# Patient Record
(demographics unavailable — no encounter records)

---

## 2024-10-11 NOTE — PHYSICAL EXAM
[Chaperone Present] : A chaperone was present in the examining room during all aspects of the physical examination [42304] : A chaperone was present during the pelvic exam. [Fully active, able to carry on all pre-disease performance without restriction] : Status 0 - Fully active, able to carry on all pre-disease performance without restriction

## 2024-10-11 NOTE — HISTORY OF PRESENT ILLNESS
[FreeTextEntry1] : **OZEMPIC** 55yo P2  LMP unknown with Mirena IUD in place since 2016. 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 She is s/p B/L mastectomy with Dr Lovell on 9/24/24 and is due to meet with Dr. Gutierrez ([facility]) for adjuvant/ maintenance therapy counseling. She desires RR surgery with hyst/bso. she denies n/v/fever/bleeding/bloating. Reports normal urination and BMs.   PMHx: HLD, DM, sleep apnea, breast ca PSHx: c/s x2, b/l mastectomy 9/2024 OBGYNHx: C/s x2, no hx of fibroids/cysts/abn paps/stis FamHx: father prostate ca. + stomach cancer. no gyn ca, no breast ca, no colon ca SocHx: denies All: none   mammogram: up to date, dx'd breast ca 2024 colonoscopy: up to date   Labs: 9/6/24: BRCA2+   4/27/24: NILM/ HPV neg

## 2024-10-11 NOTE — DISCUSSION/SUMMARY
[Reviewed Clinical Lab Test(s)] : Results of clinical tests were reviewed. [Discuss Alternatives/Risks/Benefits w/Patient] : All alternatives, risks, and benefits were discussed with the patient/family and all questions were answered.  Patient expressed good understanding and appreciates the importance of follow up as recommended. [Visit Time ___ Minutes] : [unfilled] minutes [Face to Face Time___ Minutes] : with [unfilled] minutes in face to face consultation. [FreeTextEntry1] : 57yo P2 w/ recent dx of ER/CA+ breast ca and BRCA2+ mutation. Desires RR hyst/bso. -more than 50% of visit spent face to face with patient reviewing records and interpreting imaging/path/lab results, counseling and coordinating care  -I reviewed in detail her BRCA2+ status. I explained that in patients with known deleterious BRCA2 mutation there are several benefits to prophylactic BSO. One is the decreased risk of developing ovarian cancer, the one other benefit is a decrease in the recurrence of HR+ breast cancer. The lifetime risk of developing ovarian cancer in patients with deleterious BRCA2 mutation is 20% -I explained that the current recommendation from our governing bodies ( SGO and ACOG ) is that patients undergo RRBSO at the age of 35 or when childbearing is complete. Current data suggest that the origin of most high-grade serous tumors associated with BRCA1 mutation arise from the fallopian tube. A study assessing the effect of risk reducing salpingectomy (without removal of the ovaries) is ongoing. Without these results we cannot know if RRS will be as effective as RRBSO in decreasing risk of ovarian cancer. However, based on current SGO recommendations, it can be considered as an option in younger women who do not yet want to enter surgical menopause. -I explained to patient that although guidelines state age 35 or when childbearing complete, there is data that supports delaying RR surgery in BRCA2 carriers to 45-49yo since highest risk for ovarian cancer falls in 50-60th decades as opposed to BRCA1 carriers whose highest risk for ovarian cancer is in 40th decade. -I explained role of Hysterectomy and that it is not recommended as part of standard RR surgery guideline although can be an option for BRCA1+ patients given the 5-8% risk of developing uterine cancer in this population, or an elective option if patient desires removal. It can also be offered in cases of tamoxifen use as there is an increased risk of uterine pre-cancer and cancer with tamoxifen use. all questions answered and patient expressed understanding -Pt likely already in menopause but I did review symptoms of menopause that she may experience transiently post-op are hot flushes/mood changes/decreased libido/vaginal dryness/insomnia. I reviewed decreased bone health/cardiac health/mental health as part of natural menopause transition which she has gone through -Finally I reviewed screening options, although screening modalities have been proven ineffective in detecting ovarian cancer at earlier stages. In the absence of better options pelvic exams, transvaginal ultrasounds and CA-125 every 6 months can be considered for patients who decline surgical intervention. -patient strongly desires RR hyst/bso. I reviewed surgical plan and MIS approach with robotics. I discussed role of frozen section at time of surgery and if cancer found then cancer staging would be performed which includes pelvic and paraaortic LND, peritoneal biopsies, omx. all questions answered and patient expressed understanding -baseline pelvic sono and ca125, call with results -OR booking: robo tlh/bso/possible staging/ possible laparotomy -ERAS, pre-op clearance at [facility], labs, covid testing as pre protocol -pt advised to stop Ozempic 1wk prior to surgery -the above diagnosis, nature of treatment, procedure, options, benefits, and risks were reviewed. I explained in detail the possible complications including but not limited to bleeding, transfusion, transfusion complications, infection, injury to internal organs such as injury to gastrointestinal or urinary tract, possible fistula formation, prolonged catherization, intestinal or urinary tract obstruction, vascular injury, wound complications, venous thrombosis, pulmonary embolus, sepsis, pain, chronic disability, and fatal complications, possible re-operation to correct complications, and possible abandoning or modifying the procedure due to inoperative findings was discussed. All questions were answered. Patient verbally indicated that she understood the nature of treatment, indications, options, benefits, and risks. Patient elected and consented to the procedure. Pt was informed that there was no guarantee for outcome or cure, and that additional therapy may be indicated depending on the operative findings.  -f/u post-op.  -pain/fever/bleeding precautions given

## 2024-10-11 NOTE — DISCUSSION/SUMMARY
[Reviewed Clinical Lab Test(s)] : Results of clinical tests were reviewed. [Discuss Alternatives/Risks/Benefits w/Patient] : All alternatives, risks, and benefits were discussed with the patient/family and all questions were answered.  Patient expressed good understanding and appreciates the importance of follow up as recommended. [Visit Time ___ Minutes] : [unfilled] minutes [Face to Face Time___ Minutes] : with [unfilled] minutes in face to face consultation. [FreeTextEntry1] : 55yo P2 w/ recent dx of ER/[place]+ breast ca and BRCA2+ mutation. Desires RR hyst/bso. -more than 50% of visit spent face to face with patient reviewing records and interpreting imaging/path/lab results, counseling and coordinating care  -I reviewed in detail her BRCA2+ status. I explained that in patients with known deleterious BRCA2 mutation there are several benefits to prophylactic BSO. One is the decreased risk of developing ovarian cancer, the one other benefit is a decrease in the recurrence of HR+ breast cancer. The lifetime risk of developing ovarian cancer in patients with deleterious BRCA2 mutation is 20% -I explained that the current recommendation from our governing bodies ( SGO and ACOG ) is that patients undergo RRBSO at the age of 35 or when childbearing is complete. Current data suggest that the origin of most high-grade serous tumors associated with BRCA1 mutation arise from the fallopian tube. A study assessing the effect of risk reducing salpingectomy (without removal of the ovaries) is ongoing. Without these results we cannot know if RRS will be as effective as RRBSO in decreasing risk of ovarian cancer. However, based on current SGO recommendations, it can be considered as an option in younger women who do not yet want to enter surgical menopause. -I explained to patient that although guidelines state age 35 or when childbearing complete, there is data that supports delaying RR surgery in BRCA2 carriers to 45-49yo since highest risk for ovarian cancer falls in 50-60th decades as opposed to BRCA1 carriers whose highest risk for ovarian cancer is in 40th decade. -I explained role of Hysterectomy and that it is not recommended as part of standard RR surgery guideline although can be an option for BRCA1+ patients given the 5-8% risk of developing uterine cancer in this population, or an elective option if patient desires removal. It can also be offered in cases of tamoxifen use as there is an increased risk of uterine pre-cancer and cancer with tamoxifen use. all questions answered and patient expressed understanding -Pt likely already in menopause but I did review symptoms of menopause that she may experience transiently post-op are hot flushes/mood changes/decreased libido/vaginal dryness/insomnia. I reviewed decreased bone health/cardiac health/mental health as part of natural menopause transition which she has gone through -Finally I reviewed screening options, although screening modalities have been proven ineffective in detecting ovarian cancer at earlier stages. In the absence of better options pelvic exams, transvaginal ultrasounds and CA-125 every 6 months can be considered for patients who decline surgical intervention. -patient strongly desires RR hyst/bso. I reviewed surgical plan and MIS approach with robotics. I discussed role of frozen section at time of surgery and if cancer found then cancer staging would be performed which includes pelvic and paraaortic LND, peritoneal biopsies, omx. all questions answered and patient expressed understanding -baseline pelvic sono and ca125, call with results -OR booking: robo tlh/bso/possible staging/ possible laparotomy -ERAS, pre-op clearance at [facility], labs, covid testing as pre protocol -pt advised to stop Ozempic 1wk prior to surgery -the above diagnosis, nature of treatment, procedure, options, benefits, and risks were reviewed. I explained in detail the possible complications including but not limited to bleeding, transfusion, transfusion complications, infection, injury to internal organs such as injury to gastrointestinal or urinary tract, possible fistula formation, prolonged catherization, intestinal or urinary tract obstruction, vascular injury, wound complications, venous thrombosis, pulmonary embolus, sepsis, pain, chronic disability, and fatal complications, possible re-operation to correct complications, and possible abandoning or modifying the procedure due to inoperative findings was discussed. All questions were answered. Patient verbally indicated that she understood the nature of treatment, indications, options, benefits, and risks. Patient elected and consented to the procedure. Pt was informed that there was no guarantee for outcome or cure, and that additional therapy may be indicated depending on the operative findings.  -f/u post-op.  -pain/fever/bleeding precautions given

## 2024-10-11 NOTE — PHYSICAL EXAM
[Chaperone Present] : A chaperone was present in the examining room during all aspects of the physical examination [44227] : A chaperone was present during the pelvic exam. [Fully active, able to carry on all pre-disease performance without restriction] : Status 0 - Fully active, able to carry on all pre-disease performance without restriction

## 2024-10-11 NOTE — HISTORY OF PRESENT ILLNESS
[FreeTextEntry1] : **OZEMPIC** 55yo P2  LMP unknown with Mirena IUD in place since 2016. Pt referred for new diagnosis of ER/NE+ DCIS and finding of BRCA2 mutation. She is s/p B/L mastectomy with Dr Lovell on 9/24/24 and is due to meet with Dr. Gutierrez ([facility]) for adjuvant/ maintenance therapy counseling. She desires RR surgery with hyst/bso. she denies n/v/fever/bleeding/bloating. Reports normal urination and BMs.   PMHx: HLD, DM, sleep apnea, breast ca PSHx: c/s x2, b/l mastectomy 9/2024 OBGYNHx: C/s x2, no hx of fibroids/cysts/abn paps/stis FamHx: father prostate ca. + stomach cancer. no gyn ca, no breast ca, no colon ca SocHx: denies All: none   mammogram: up to date, dx'd breast ca 2024 colonoscopy: up to date   Labs: 9/6/24: BRCA2+   4/27/24: NILM/ HPV neg

## 2024-10-11 NOTE — DISCUSSION/SUMMARY
[Reviewed Clinical Lab Test(s)] : Results of clinical tests were reviewed. [Discuss Alternatives/Risks/Benefits w/Patient] : All alternatives, risks, and benefits were discussed with the patient/family and all questions were answered.  Patient expressed good understanding and appreciates the importance of follow up as recommended. [Visit Time ___ Minutes] : [unfilled] minutes [Face to Face Time___ Minutes] : with [unfilled] minutes in face to face consultation. [FreeTextEntry1] : 55yo P2 w/ recent dx of ER/[place]+ breast ca and BRCA2+ mutation. Desires RR hyst/bso. -more than 50% of visit spent face to face with patient reviewing records and interpreting imaging/path/lab results, counseling and coordinating care  -I reviewed in detail her BRCA2+ status. I explained that in patients with known deleterious BRCA2 mutation there are several benefits to prophylactic BSO. One is the decreased risk of developing ovarian cancer, the one other benefit is a decrease in the recurrence of HR+ breast cancer. The lifetime risk of developing ovarian cancer in patients with deleterious BRCA2 mutation is 20% -I explained that the current recommendation from our governing bodies ( SGO and ACOG ) is that patients undergo RRBSO at the age of 35 or when childbearing is complete. Current data suggest that the origin of most high-grade serous tumors associated with BRCA1 mutation arise from the fallopian tube. A study assessing the effect of risk reducing salpingectomy (without removal of the ovaries) is ongoing. Without these results we cannot know if RRS will be as effective as RRBSO in decreasing risk of ovarian cancer. However, based on current SGO recommendations, it can be considered as an option in younger women who do not yet want to enter surgical menopause. -I explained to patient that although guidelines state age 35 or when childbearing complete, there is data that supports delaying RR surgery in BRCA2 carriers to 45-51yo since highest risk for ovarian cancer falls in 50-60th decades as opposed to BRCA1 carriers whose highest risk for ovarian cancer is in 40th decade. -I explained role of Hysterectomy and that it is not recommended as part of standard RR surgery guideline although can be an option for BRCA1+ patients given the 5-8% risk of developing uterine cancer in this population, or an elective option if patient desires removal. It can also be offered in cases of tamoxifen use as there is an increased risk of uterine pre-cancer and cancer with tamoxifen use. all questions answered and patient expressed understanding -Pt likely already in menopause but I did review symptoms of menopause that she may experience transiently post-op are hot flushes/mood changes/decreased libido/vaginal dryness/insomnia. I reviewed decreased bone health/cardiac health/mental health as part of natural menopause transition which she has gone through -Finally I reviewed screening options, although screening modalities have been proven ineffective in detecting ovarian cancer at earlier stages. In the absence of better options pelvic exams, transvaginal ultrasounds and CA-125 every 6 months can be considered for patients who decline surgical intervention. -patient strongly desires RR hyst/bso. I reviewed surgical plan and MIS approach with robotics. I discussed role of frozen section at time of surgery and if cancer found then cancer staging would be performed which includes pelvic and paraaortic LND, peritoneal biopsies, omx. all questions answered and patient expressed understanding -baseline pelvic sono and ca125, call with results -OR booking: robo tlh/bso/possible staging/ possible laparotomy -ERAS, pre-op clearance at [facility], labs, covid testing as pre protocol -pt advised to stop Ozempic 1wk prior to surgery -the above diagnosis, nature of treatment, procedure, options, benefits, and risks were reviewed. I explained in detail the possible complications including but not limited to bleeding, transfusion, transfusion complications, infection, injury to internal organs such as injury to gastrointestinal or urinary tract, possible fistula formation, prolonged catherization, intestinal or urinary tract obstruction, vascular injury, wound complications, venous thrombosis, pulmonary embolus, sepsis, pain, chronic disability, and fatal complications, possible re-operation to correct complications, and possible abandoning or modifying the procedure due to inoperative findings was discussed. All questions were answered. Patient verbally indicated that she understood the nature of treatment, indications, options, benefits, and risks. Patient elected and consented to the procedure. Pt was informed that there was no guarantee for outcome or cure, and that additional therapy may be indicated depending on the operative findings.  -f/u post-op.  -pain/fever/bleeding precautions given

## 2024-10-11 NOTE — HISTORY OF PRESENT ILLNESS
[FreeTextEntry1] : **OZEMPIC** 57yo P2  LMP unknown with Mirena IUD in place since 2016. Pt referred for new diagnosis of ER/[place]+ DCIS and finding of BRCA2 mutation. She is s/p B/L mastectomy with Dr Lovell on 9/24/24 and is due to meet with Dr. Gutierrez ([facility]) for adjuvant/ maintenance therapy counseling. She desires RR surgery with hyst/bso. she denies n/v/fever/bleeding/bloating. Reports normal urination and BMs.   PMHx: HLD, DM, sleep apnea, breast ca PSHx: c/s x2, b/l mastectomy 9/2024 OBGYNHx: C/s x2, no hx of fibroids/cysts/abn paps/stis FamHx: father prostate ca. + stomach cancer. no gyn ca, no breast ca, no colon ca SocHx: denies All: none   mammogram: up to date, dx'd breast ca 2024 colonoscopy: up to date   Labs: 9/6/24: BRCA2+   4/27/24: NILM/ HPV neg

## 2024-10-11 NOTE — HISTORY OF PRESENT ILLNESS
[FreeTextEntry1] : **OZEMPIC** 55yo P2  LMP unknown with Mirena IUD in place since 2016. Pt referred for new diagnosis of ER/[place]+ DCIS and finding of BRCA2 mutation. She is s/p B/L mastectomy with Dr Lovell on 9/24/24 and is due to meet with Dr. Gutierrez ([facility]) for adjuvant/ maintenance therapy counseling. She desires RR surgery with hyst/bso. she denies n/v/fever/bleeding/bloating. Reports normal urination and BMs.   PMHx: HLD, DM, sleep apnea, breast ca PSHx: c/s x2, b/l mastectomy 9/2024 OBGYNHx: C/s x2, no hx of fibroids/cysts/abn paps/stis FamHx: father prostate ca. + stomach cancer. no gyn ca, no breast ca, no colon ca SocHx: denies All: none   mammogram: up to date, dx'd breast ca 2024 colonoscopy: up to date   Labs: 9/6/24: BRCA2+   4/27/24: NILM/ HPV neg

## 2024-10-11 NOTE — PHYSICAL EXAM
[Chaperone Present] : A chaperone was present in the examining room during all aspects of the physical examination [40368] : A chaperone was present during the pelvic exam. [Fully active, able to carry on all pre-disease performance without restriction] : Status 0 - Fully active, able to carry on all pre-disease performance without restriction

## 2024-10-11 NOTE — HISTORY OF PRESENT ILLNESS
[FreeTextEntry1] : **OZEMPIC** 55yo P2  LMP unknown with Mirena IUD in place since 2016. Pt referred for new diagnosis of ER/[place]+ DCIS and finding of BRCA2 mutation. She is s/p B/L mastectomy with Dr Lovell on 9/24/24 and is due to meet with Dr. Gutierrez ([facility]) for adjuvant/ maintenance therapy counseling. She desires RR surgery with hyst/bso. she denies n/v/fever/bleeding/bloating. Reports normal urination and BMs.   PMHx: HLD, DM, sleep apnea, breast ca PSHx: c/s x2, b/l mastectomy 9/2024 OBGYNHx: C/s x2, no hx of fibroids/cysts/abn paps/stis FamHx: father prostate ca. + stomach cancer. no gyn ca, no breast ca, no colon ca SocHx: denies All: none   mammogram: up to date, dx'd breast ca 2024 colonoscopy: up to date   Labs: 9/6/24: BRCA2+   4/27/24: NILM/ HPV neg

## 2024-10-11 NOTE — HISTORY OF PRESENT ILLNESS
[FreeTextEntry1] : **OZEMPIC** 57yo P2  LMP unknown with Mirena IUD in place since 2016. Pt referred for new diagnosis of ER/[place]+ DCIS and finding of BRCA2 mutation. She is s/p B/L mastectomy with Dr Lovell on 9/24/24 and is due to meet with Dr. Gutierrez ([facility]) for adjuvant/ maintenance therapy counseling. She desires RR surgery with hyst/bso. she denies n/v/fever/bleeding/bloating. Reports normal urination and BMs.   PMHx: HLD, DM, sleep apnea, breast ca PSHx: c/s x2, b/l mastectomy 9/2024 OBGYNHx: C/s x2, no hx of fibroids/cysts/abn paps/stis FamHx: father prostate ca. + stomach cancer. no gyn ca, no breast ca, no colon ca SocHx: denies All: none   mammogram: up to date, dx'd breast ca 2024 colonoscopy: up to date   Labs: 9/6/24: BRCA2+   4/27/24: NILM/ HPV neg

## 2024-11-07 NOTE — PHYSICAL EXAM
[Chaperone Declined] : Patient declined chaperone [Fully active, able to carry on all pre-disease performance without restriction] : Status 0 - Fully active, able to carry on all pre-disease performance without restriction

## 2024-11-07 NOTE — HISTORY OF PRESENT ILLNESS
[FreeTextEntry1] : 57yo P2 w/ recent dx of ER/[place]+ breast ca and BRCA2+ mutation now s/p RR robo tlh, bso, rajan on 10/22/24   Since procedure, patient reports doing well. She reports minimal pain and has resumed most normal activity with good tolerance. She is no longer taking pain meds. She reports normal appetite. She denies fever/bleeding/bloating. Reports normal urination and BMs.  Path results reviewed. Pathology:  FINAL PATHOLOGIC DIAGNOSIS A. UTERUS, CERVIX, BILATERAL FALLOPIAN TUBES AND OVARIES: - Endometrial polyp - Background endometrium showing decidualized  stroma and atrophic cystic glands consistent with progestin effect. - Myometrium without significant histopathologic abnormalities. - Cervix with chronic cervicitis and large polypoid nabothian cysts. - Bilateral fallopian tubes without significant histopathologic abnormalities. - Bilateral ovaries with multiple benign cysts and cortical ovarian stromal hyperplasia. - Intrauterine device; please see gross description. - Negative for carcinoma in the specimen examined.

## 2024-11-07 NOTE — DISCUSSION/SUMMARY
[FreeTextEntry1] : 55yo P2 w/ recent dx of ER/CO+ breast ca and BRCA2+ mutation now s/p RR eua, robo tlh, bso, rajan on 10/22/24, here for initial post op and recovering well. Final pathology reviewed and benign. -more than 50% of visit spent face to face with patient counseling and coordinating care -continue limited physical activity with no heavy lifting, nothing in the vagina and no submersion in water -wound care reviewed, keep incisions clean and dry, can apply aquaphor for scabbing/itching, scar cream once incisions healed -rtc 3-4wks for cuff check -f/u with genetics team as scheduled -pain/fever/bleeding precautions given  d/w Dr Penn

## 2024-11-14 NOTE — PLAN
[FreeTextEntry1] : The visit was provided via telehealth using real-time 2-way audio visual technology. The patient, Miranda Malik, was located at home, [place], [place], at the time of the visit. The Genetic Counselor, Margie King, was located at the medical office located in [place], [place]. The physicians, Dr. Bryce Casanova was located at the medical office in [place], [place], and Dr. Loyda Corrigan was located at the medical office in [place], [place]. The patient and all providers participated in the telehealth encounter. Consent for telehealth services was given on 2024 by the patient, Miranda Malik.    REASON FOR CONSULT  Miranda Malik is a 56-year-old female who was referred by Dr. Moerlia Gutierrez for a discussion regarding her genetic testing results related to hereditary cancer predisposition.    RELEVANT MEDICAL HISTORY  Ms. Malik was diagnosed with left breast cancer on 2024 at age 56. Ms. Malik elected treatment with bilateral mastectomy with negative margins and three negative sentinel nodes. Ms. Malik chose not to undergo reconstruction. 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 Ms. Malik continues to follow up with Dr. Gutierrez and is planning at least 5 years of endocrine therapy. Of note, Ms. Malik began taking Anastrazole this week.    Ms. Malik pursued genetic testing using [facility] common hereditary cancers panel and a pathogenic mutation was detected in the BRCA2 gene ([id number]; [id number]). This test was ordered by Dr. Debra Lovell and reported on 2024.    FAMILY HISTORY:  Ms. Malik has a family history of cancer, see below.     OTHER MEDICAL AND SURGICAL HISTORY:  Type 2 diabetes diagnosed in . High cholesterol. Hysteroscopy. C section x2. D+C. Bilateral mastectomy. Total hysterectomy and bilateral salpingo-oophorectomy.    PAST OB/GYN HISTORY:  Obstetrical History:   Age at Menarche: 11  Menopausal  Age at First Live Birth: 37  Oral Contraceptive Use: Yes, (<10 years of pill). Had IUD fitted for ~12 years. Hormone Replacement Therapy: No    CANCER SCREENING HISTORY:  Breast:  Routine screening mammogram and US 2024 showed left breast calcifications and stereotactic core biopsy was recommended. Biopsy on 2024 revealed 2mm left breast IDC and 1.2cm DCIS, ER+/AL+/HER2- on immunohistochemistry. Patient underwent a bilateral mastectomy on 2024. Previous history of bilateral benign breast biopsies in 2016 for microcalcifications (no records). GYN: Patient was having routine annual Gynecologic follow up with her last exam reported wnl in 2024. Status post risk reducing total hysterectomy and bilateral salpingo-oophorectomy (uterus and cervix removed) with pathology negative for malignancy (10/21/2024). Colon:Last colonoscopy at age 50 with a single polyp removed. 10-year f/u was recommended.  Skin:Last FBSE reported wnl >5years ago. F/U scheduled in 2025.   Other: Planning consultation at [facility] (Dr. Bernardo) for pancreatic screening consideration on 2024    SOCIAL HISTORY:  -	 -	Tobacco-product use: No   FAMILY HISTORY:  Maternal ancestry was reported as [place] and paternal ancestry was reported as [place]. A detailed family history of cancer was ascertained, see below and scanned pedigree in chart.    To Ms. Malik's knowledge, no one in the family has had germline testing for cancer susceptibility.    RESULTS INTERPRETATION AND ASSESSMENT: We reviewed with Ms. Malik that she tested positive for a pathogenic mutation in the BRCA2 gene. 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 HBOC is an autosomal-dominant inherited cancer predisposition syndrome. Ms. Malik was informed that individuals with a pathogenic BRCA2 mutation have increased risks for the following:    FEMALE BREAST CANCER RISK:   Lifetime risk to develop female breast cancer is estimated to be 61-77% compared to the general population risk of 12.9%.      MALE BREAST CANCER RISK:   Lifetime risk to develop male breast cancer is estimated to be up to 7% compared to the general population risk of <1%       OVARIAN CANCER RISK:   Lifetime risk to develop ovarian cancer is estimated to be 11-25% compared to the general population risk of 1.2%. Of note, the risk for ovarian cancer by age 40 is <1% and by age 50 the risk is 1-3%.    PROSTATE CANCER RISK:   Risk to develop prostate cancer by age 65 is estimated to be 5-7%. Risk by age 85 is estimated to be up to 41-46% compared to the general population risk of 12.1% (Jackie et al. 2020,  Urology, estimates derived from adjusted numbers to account for higher prostate cancer risk estimates for men undergoing PSA screening at regular intervals).    PANCREATIC CANCER RISK:   Lifetime risk to develop pancreatic cancer is estimated to be up to 5-7% compared to the general population risk of 1.6%.       MELANOMA RISK:   Lifetime risk to develop melanoma is estimated to be up to 5% compared to the general population risk of 2.3%.        IMPLICATIONS FOR THE PATIENT:  Given Ms. Malik's personal and current reported family history of cancer, and her BRCA2 positive genetic test results, the following screening guidelines and risk-reducing recommendations were discussed:    BREAST:   - Ms. Malik is currently undergoing endocrine therapy as part of her left breast cancer treatment.  - Long-term management and surveillance should be based on Ms. Malik's on- or post-treatment protocol as recommended by her oncology team in the setting of her bilateral mastectomy without reconstruction. -Ms. Malik asked whether she should undergo regular breast imaging following her bilateral mastectomy. While routine imaging is not recommended at this time for Ms. Malik, we do encourage practicing self breast awareness and continuing with breast and chest wall clinical exams every 6 months.     OVARIAN:    - Ms. Malik has already undergone bilateral salpingo-oophorectomy and total hysterectomy. We recommended she continues to practice routine, age-appropriate gynecologic care.      MELANOMA:  - No specific screening guidelines exist, however, general melanoma risk management is appropriate, such as a full body skin examination by a physician and minimizing UV exposure.      PANCREATIC:  - At this time, there is no proven effective screening for pancreatic cancer, though the National Comprehensive Cancer Network (NCCN) states that investigational screening may be considered for individuals who have a germline BRCA2 mutation and who are over age 50. - We briefly reviewed the options for screening modality including contrast-enhanced MRI/magnetic resonance cholangiopancreatography (MRCP) and/or endoscopic ultrasound (EUS). In addition, we recommended that such screening only take place after an in-depth discussion about the potential limitations to screening, including, cost, the high incidence of pancreatic abnormalities, and uncertainties about the potential benefits of pancreatic cancer screening.   - Of note Ms. Malik has an appointment scheduled in [place] with Dr. Bernardo to discuss pancreatic cancer screening and we encouraged her to discuss the pros and cons further with this team.    OTHER:  - In the absence of other indications, Ms. Malik should practice age-appropriate cancer screening of other organ systems as recommended for the general population.    IMPLICATIONS FOR FAMILY MEMBERS:  This mutation is inherited in an autosomal dominant pattern. We recommend the patient's first-degree relatives, specifically her daughters pursue genetic counseling and genetic testing as there is a 50% chance they also have the same mutation. Genetic testing would also be recommended for Ms. Malik's maternal and paternal first cousins as they too could have inherited the BRCA2 gene mutation. A family sharing letter will be provided to Ms. Malik to facilitate communication of her results with her extended relatives. Ms. Malik was made aware that if any at-risk relatives wanted to pursue genetic testing any time in the future, we would be happy to see them and coordinate testing. If they are not local, they can locate a genetic counselor using the National Society of Genetic Counselors, Find a Genetic Counselor Tool (www.nsgc.org/findageneticcounselor).    The risk of passing on this mutation to a future generation is 50%. We recommend that the patient's daughters, who are currently aged 17 and 19, pursue genetic counseling and genetic testing ideally prior to age 25. We are available to see them for discussion and coordinate testing and would advise they meet with Cancer Genetics either now or when in their early twenties.    REPRODUCTIVE IMPLICATIONS AND OPTIONS:  Since the genetic mutation is known, pre-implantation genetic testing (PGT) is possible. PGT and prenatal diagnosis were discussed briefly for individuals of reproductive age.    Ms. Malik was also informed that individuals with biallelic mutations in BRCA2 gene have been reported to have Fanconi-Anemia (FA). FA is an autosomal recessive condition characterized by physical abnormalities (i.e. short statures, skeletal malformations), bone marrow failure and increased risk for acute myeloid leukemia and other malignancies. For those of reproductive age, we recommend the partner of an individual who is a BRCA2 carrier also have BRCA2 genetic testing to assess the risk of having a child affected with this condition if it would inform reproductive decision making. If both individuals carry a single BRCA2 mutation, there may be up to a 25% chance for each pregnancy to be affected with FA.    RESOURCES & SUPPORT GROUPS:  Facing Our Risk of cancer Empowered (FORCE): www.FacingOurRisk.org    [facility] [place] [facility]: www.BrightPink.org  Shars[employer]: www.sharsheret.org   The Breasties: https://thebreasties.org/  TOUCH - The Black Breast Cancer [place] https://touchbbca.org/   [employer]: https://latinasisterssupport.org/  HIS Breast Cancer Awareness: https://www.hisbreastcancer.org/  National LGBT+ Cancer Network: https://cancer-network.org/    In addition, the oncology social workers at [facility] are available to assist with more referrals, if necessary.    PLAN:  1. See above note for recommended management.  2. We encouraged sharing these results with family members as noted above. They have a risk to have inherited the same mutation. Other family may benefit from genetic testing and should contact a certified genetic counselor specializing in cancer. Due to HIPAA and [place] laws, Genetics is unable to directly contact other family at risk, but we are available should family members wish to reach out to us.  3. Family support resources and referrals were provided.   4. Patient informed consult note(s) will be available through their [facility] patient portal.  5. Genetic knowledge changes rapidly. Given this, we encouraged re-contacting Cancer Genetics every 2-3 years for any changes in screening recommendations or sooner if there are significant changes in personal or family history.    For any additional questions please call Cancer Genetics at [phone].         Margie King, MSc, [facility]  Genetic Counselor, Cancer Genetics    Bryce Casanova MD  Chief, Cancer Genetics    CC:  Miranda Gutierrez

## 2024-12-06 NOTE — HISTORY OF PRESENT ILLNESS
[FreeTextEntry1] : 55yo P2 w/ recent dx of ER/SC+ breast ca and BRCA2+ mutation now s/p RR rhyso tlh, bso, rajan on 10/22/24  Since last visit patient reports doing well. She has resumed most normal activity with good tolerance. She reports normal appetite. She denies fever/bleeding/bloating. Reports normal urination and BMs. path reviewed again and benign findings. reviewed plan to resume care with general GYN and with PCP for health maintenance.  Path results reviewed. Pathology:  FINAL PATHOLOGIC DIAGNOSIS A. UTERUS, CERVIX, BILATERAL FALLOPIAN TUBES AND OVARIES: - Endometrial polyp - Background endometrium showing decidualized  stroma and atrophic cystic glands consistent with progestin effect. - Myometrium without significant histopathologic abnormalities. - Cervix with chronic cervicitis and large polypoid nabothian cysts. - Bilateral fallopian tubes without significant histopathologic abnormalities. - Bilateral ovaries with multiple benign cysts and cortical ovarian stromal hyperplasia. - Intrauterine device; please see gross description. - Negative for carcinoma in the specimen examined.

## 2024-12-06 NOTE — HISTORY OF PRESENT ILLNESS
[FreeTextEntry1] : 55yo P2 w/ recent dx of ER/CO+ breast ca and BRCA2+ mutation now s/p RR rhyso tlh, bso, rajan on 10/22/24  Since last visit patient reports doing well. She has resumed most normal activity with good tolerance. She reports normal appetite. She denies fever/bleeding/bloating. Reports normal urination and BMs. path reviewed again and benign findings. reviewed plan to resume care with general GYN and with PCP for health maintenance.  Path results reviewed. Pathology:  FINAL PATHOLOGIC DIAGNOSIS A. UTERUS, CERVIX, BILATERAL FALLOPIAN TUBES AND OVARIES: - Endometrial polyp - Background endometrium showing decidualized  stroma and atrophic cystic glands consistent with progestin effect. - Myometrium without significant histopathologic abnormalities. - Cervix with chronic cervicitis and large polypoid nabothian cysts. - Bilateral fallopian tubes without significant histopathologic abnormalities. - Bilateral ovaries with multiple benign cysts and cortical ovarian stromal hyperplasia. - Intrauterine device; please see gross description. - Negative for carcinoma in the specimen examined.

## 2024-12-06 NOTE — DISCUSSION/SUMMARY
[Reviewed Clinical Lab Test(s)] : Results of clinical tests were reviewed. [Discuss Alternatives/Risks/Benefits w/Patient] : All alternatives, risks, and benefits were discussed with the patient/family and all questions were answered.  Patient expressed good understanding and appreciates the importance of follow up as recommended. [Visit Time ___ Minutes] : [unfilled] minutes [Face to Face Time___ Minutes] : with [unfilled] minutes in face to face consultation. [FreeTextEntry1] : 57yo P2 w/ recent dx of ER/MN+ breast ca and BRCA2+ mutation now s/p RR eua, robo [place], bso, rajan on 10/22/24. Final pathology reviewed and benign. Well healed from surgery. No further intervention -more than 50% of visit spent face to face with patient counseling and coordinating care -reviewed benign path and no further intervention. reviewed no guidelines to continue high risk screening with pelvic sono after RR surgery. advised continued annual well woman visits with GYN and continue health maintenance with PCP -pt can resume all baseline activities without restrictions -rtc 3-4 mos with SHERIF Hassan and then d/c to GYN -pain/fever/bleeding precautions given

## 2024-12-06 NOTE — DISCUSSION/SUMMARY
[Reviewed Clinical Lab Test(s)] : Results of clinical tests were reviewed. [Discuss Alternatives/Risks/Benefits w/Patient] : All alternatives, risks, and benefits were discussed with the patient/family and all questions were answered.  Patient expressed good understanding and appreciates the importance of follow up as recommended. [Visit Time ___ Minutes] : [unfilled] minutes [Face to Face Time___ Minutes] : with [unfilled] minutes in face to face consultation. [FreeTextEntry1] : 57yo P2 w/ recent dx of ER/IA+ breast ca and BRCA2+ mutation now s/p RR eua, robo [place], bso, rajan on 10/22/24. Final pathology reviewed and benign. Well healed from surgery. No further intervention -more than 50% of visit spent face to face with patient counseling and coordinating care -reviewed benign path and no further intervention. reviewed no guidelines to continue high risk screening with pelvic sono after RR surgery. advised continued annual well woman visits with GYN and continue health maintenance with PCP -pt can resume all baseline activities without restrictions -rtc 3-4 mos with SHERIF Hassan and then d/c to GYN -pain/fever/bleeding precautions given

## 2024-12-20 NOTE — HISTORY OF PRESENT ILLNESS
[FreeTextEntry1] : 56 year old female comes in today as a new patient for management of her obesity and diabetes. She has been on Ozempic 1 mg for approximately 2 years. She recently went through a diagnosis of breast cancer and had a double mastectomy and ultimately also a PATY/BSO because she is BRCA 2 positive. She has an endoscopic evaluation of her pancreas scheduled for January.  Patient says she "eats for every reason:" happiess, sadness, boredom, frustration, anger, etc. She gets a dopamine high from food. She knows and enjoys healthy food, but portion control is an issue, and sometimes when she doesn't make the healthiest choice then the portion control problem is an even bigger issue. She does not sound as if the 1 mg Ozempic dose is doing much to help curb her appetite. It has been over a year since the dose was increased.   Patient has been doing PT for chronic pain of the right ankle. She also has bilateral chronic knee pain because of meniscus issues. When she tries an exercise she enjoys like dancing or the rebounder, her chronic pain gets in the way. She is open to starting a strength routine. Her child, Gaetano, just started weight lifting. They do have 5 pound weights at home. she might start with body weight.   24 hour recall: Breakfast: steamed eggs with kimchi, scallions, soy sauce Lunch: Ninja cream nice cream with pumpkin, almond milk, dates Snack: 1/2 bag cheetos Dinner: Chinese food -- 4 spare ribs, shrimp with broccoli, brown rice

## 2024-12-20 NOTE — SOCIAL HISTORY
[TextEntry] : Lives with  and 17 year old child Gaetano. Daughter Gayal is 19 and out of the house. 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 No alcohol. No drug use.

## 2024-12-20 NOTE — ASSESSMENT
[FreeTextEntry1] : 1. Obesity/BMI 33/Type 2 diabetes: The patient has been on Ozempic 1 mg for over a year and is overdue for lab work. Will check cmp, cbc, tsh, hgb a1c and lipids and will send to PCP, Dr. Martino. At this point, patient does not seem to be experiencing the appetite suppressant effects of Ozempic any longer. I suggested increasing to 2 mg weekly. If she has trouble tolerating it, she can alternate the 1 mg and 2 mg doses for a few weeks. Will also send zofran prn to her pharmacy as when she first initiated the drug, nausea was a problem for her. Discussed lifestyle measures. She agreed to the following: Tuesdays and Thursdays in the morning will do 30 minutes of upper body and core exercises  Wednesdays and Fridays in the morning will do 30 minutes of lower body and core exercises  Youtube: Either type in 15 minutes core or 15 minutes light weights upper body or 15 minutes light weights lower body.  Consider pilates for lower body until ankle and knees are more stable.  Myfitnesspal - Track just for 3 days - I'm interested in your total fiber.   2. Hyperlipidemia: Patient is on 5 mg of rosuvastatin daily. I ordered a lipid panel as she has not had one in a year. Will notify patient of results. Encourage low saturated fat high soluble fiber diet with regular exercise.   3. Chronic knee and ankle pain: Patient is limited in some of her physical activity due to these injuries. It may be best for her lower body exercises to be more like pilates in nature until these issues are resolved. She will adjust accordingly. Also, she might consider a  who can fine tune a regimen for her based on her limitations.   Follow up just before or just after the 4th 2 mg dose shot.  Total time spent reviewing records, seeing patient, generating action plan, prescribing medication and documenting visit 80 minutes

## 2025-03-25 NOTE — HISTORY OF PRESENT ILLNESS
[FreeTextEntry1] : 55yo P2 w/ recent dx of ER/NE+ breast ca and BRCA2+ mutation now s/p RR robo tlh, bso, rajan on 10/22/24  Since last visit patient reports doing well. She has resumed most normal activity with good tolerance. She reports normal appetite. She denies fever/bleeding/bloating. Reports normal urination and BMs. path reviewed again and benign findings. Pt with some vaginal irritation with sexual activity, discussed atrophy and vaginal moisturizers. reviewed plan to resume care with general GYN and with PCP for health maintenance.  Path results reviewed. Pathology:  FINAL PATHOLOGIC DIAGNOSIS A. UTERUS, CERVIX, BILATERAL FALLOPIAN TUBES AND OVARIES: - Endometrial polyp - Background endometrium showing decidualized  stroma and atrophic cystic glands consistent with progestin effect. - Myometrium without significant histopathologic abnormalities. - Cervix with chronic cervicitis and large polypoid nabothian cysts. - Bilateral fallopian tubes without significant histopathologic abnormalities. - Bilateral ovaries with multiple benign cysts and cortical ovarian stromal hyperplasia. - Intrauterine device; please see gross description. - Negative for carcinoma in the specimen examined.

## 2025-03-25 NOTE — HISTORY OF PRESENT ILLNESS
[FreeTextEntry1] : 57yo P2 w/ recent dx of ER/MT+ breast ca and BRCA2+ mutation now s/p RR robo tlh, bso, rajan on 10/22/24  Since last visit patient reports doing well. She has resumed most normal activity with good tolerance. She reports normal appetite. She denies fever/bleeding/bloating. Reports normal urination and BMs. path reviewed again and benign findings. Pt with some vaginal irritation with sexual activity, discussed atrophy and vaginal moisturizers. reviewed plan to resume care with general GYN and with PCP for health maintenance.  Path results reviewed. Pathology:  FINAL PATHOLOGIC DIAGNOSIS A. UTERUS, CERVIX, BILATERAL FALLOPIAN TUBES AND OVARIES: - Endometrial polyp - Background endometrium showing decidualized  stroma and atrophic cystic glands consistent with progestin effect. - Myometrium without significant histopathologic abnormalities. - Cervix with chronic cervicitis and large polypoid nabothian cysts. - Bilateral fallopian tubes without significant histopathologic abnormalities. - Bilateral ovaries with multiple benign cysts and cortical ovarian stromal hyperplasia. - Intrauterine device; please see gross description. - Negative for carcinoma in the specimen examined.

## 2025-03-25 NOTE — DISCUSSION/SUMMARY
[Reviewed Clinical Lab Test(s)] : Results of clinical tests were reviewed. [Discuss Alternatives/Risks/Benefits w/Patient] : All alternatives, risks, and benefits were discussed with the patient/family and all questions were answered.  Patient expressed good understanding and appreciates the importance of follow up as recommended. [Visit Time ___ Minutes] : [unfilled] minutes [Face to Face Time___ Minutes] : with [unfilled] minutes in face to face consultation. [FreeTextEntry1] : 57yo P2 w/ recent dx of ER/MI+ breast ca and BRCA2+ mutation now s/p RR eua, robo [place], bso, rajan on 10/22/24. Final pathology reviewed and benign. Well healed from surgery. No further intervention. -more than 50% of visit spent face to face with patient counseling and coordinating care -reviewed benign path and no further intervention. reviewed no guidelines to continue high risk screening with pelvic sono after RR surgery. advised continued annual well woman visits with GYN and continue health maintenance with PCP -menopause information sheet provided, and instructions reviewed on vaginal moisturizers -pain/fever/bleeding precautions given d/w Dr Martini

## 2025-03-25 NOTE — DISCUSSION/SUMMARY
[Reviewed Clinical Lab Test(s)] : Results of clinical tests were reviewed. [Discuss Alternatives/Risks/Benefits w/Patient] : All alternatives, risks, and benefits were discussed with the patient/family and all questions were answered.  Patient expressed good understanding and appreciates the importance of follow up as recommended. [Visit Time ___ Minutes] : [unfilled] minutes [Face to Face Time___ Minutes] : with [unfilled] minutes in face to face consultation. [FreeTextEntry1] : 55yo P2 w/ recent dx of ER/ME+ breast ca and BRCA2+ mutation now s/p RR eua, robo [place], bso, rajan on 10/22/24. Final pathology reviewed and benign. Well healed from surgery. No further intervention. -more than 50% of visit spent face to face with patient counseling and coordinating care -reviewed benign path and no further intervention. reviewed no guidelines to continue high risk screening with pelvic sono after RR surgery. advised continued annual well woman visits with GYN and continue health maintenance with PCP -menopause information sheet provided, and instructions reviewed on vaginal moisturizers -pain/fever/bleeding precautions given d/w Dr Martini

## 2025-03-25 NOTE — PHYSICAL EXAM
[Chaperone Declined] : Patient declined chaperone [Fully active, able to carry on all pre-disease performance without restriction] : Status 0 - Fully active, able to carry on all pre-disease performance without restriction [Chaperone Present] : A chaperone was present in the examining room during all aspects of the physical examination

## 2025-06-02 NOTE — ASSESSMENT
[FreeTextEntry1] : Obesity/BMI 32 with Type 2 Diabetes:  Patient is tolerating the Ozempic 2 mg.  We talked about meal planning and making sure that she has some sort of cue to ensure regular nutritional intake.  She will have labs done including A1c, lipids, CMP and TSH.  I will send her the prescription and follow-up with her when I have those results.  I spent a good amount of time reiterating how important the strength training is especially when in the process of losing weight/calorie deficit.  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  If she does 15 minutes each morning, then she will get a substantial amount of strength training in over the course of the week.  I explained the difference between nonexercise activity and formal exercise.  Confirmed patient's nutritional profile has improved substantially.  Recommend lean protein, low saturated fat, low added sugar, high-fiber diet.  Follow-up 3 months.

## 2025-06-02 NOTE — HISTORY OF PRESENT ILLNESS
[FreeTextEntry1] : 57-year-old female presents/consents for telehealth with video and audio input.  Miranda comes in today to follow-up on weight management and diabetes.  She went up to the Ozempic 2 mg.  She said she adjusted to that much better than when she was initiating therapy when she first started the drug.  She delayed her follow-up with me because she had number of procedures that required she stop the drugs and she had leftover 1 mg doses and she used that to reinitiate, so she wanted to see me after she had been on the 2 mg dose for more than a few weeks.  She did not notice a huge change in appetite.  She describes the appetite suppression as never being sure when she is hungry.    She said her exercise habits have not been good.  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  She has been trying to be more active in general doing things like gardening.  24-hour recall Breakfast apple, fat-free Greek yogurt, banana, 2 scoops of peanut butter powder, for dates, a tablespoon of [place] seeds with unsweetened almond milk Lunch: Quinoa with some chicken and sweet potatoes Dinner: A slice of pizza She has been getting much more fiber in, trying to eat more fruit.  The weeks that she has time to plan, her nutrition is much better than the weights she does not.  She established with a new primary care doctor, Dr. Haley, in [place].  She has a physical scheduled for December.  She had her last set of labs more than 3 months ago, so we will check a lipid panel, CMP, A1c.  Because she lives in homes, I will send her a written prescription and she will do it at a local laboratory rather than drive down to [place].